# Patient Record
Sex: MALE | Race: WHITE | NOT HISPANIC OR LATINO | Employment: OTHER | URBAN - METROPOLITAN AREA
[De-identification: names, ages, dates, MRNs, and addresses within clinical notes are randomized per-mention and may not be internally consistent; named-entity substitution may affect disease eponyms.]

---

## 2024-07-11 ENCOUNTER — OFFICE VISIT (OUTPATIENT)
Dept: FAMILY MEDICINE CLINIC | Facility: CLINIC | Age: 59
End: 2024-07-11
Payer: COMMERCIAL

## 2024-07-11 ENCOUNTER — TELEPHONE (OUTPATIENT)
Dept: FAMILY MEDICINE CLINIC | Facility: CLINIC | Age: 59
End: 2024-07-11

## 2024-07-11 VITALS
SYSTOLIC BLOOD PRESSURE: 130 MMHG | RESPIRATION RATE: 17 BRPM | DIASTOLIC BLOOD PRESSURE: 70 MMHG | OXYGEN SATURATION: 95 % | HEIGHT: 70 IN | TEMPERATURE: 98.2 F | HEART RATE: 71 BPM | BODY MASS INDEX: 33.21 KG/M2 | WEIGHT: 232 LBS

## 2024-07-11 DIAGNOSIS — Z00.00 ANNUAL PHYSICAL EXAM: Primary | ICD-10-CM

## 2024-07-11 DIAGNOSIS — N48.1 BALANITIS: Primary | ICD-10-CM

## 2024-07-11 DIAGNOSIS — Z12.5 SCREENING FOR PROSTATE CANCER: ICD-10-CM

## 2024-07-11 PROCEDURE — 99203 OFFICE O/P NEW LOW 30 MIN: CPT | Performed by: FAMILY MEDICINE

## 2024-07-11 RX ORDER — CLOTRIMAZOLE 1 %
1 CREAM (GRAM) TOPICAL 2 TIMES DAILY
Qty: 60 G | Refills: 1 | Status: SHIPPED | OUTPATIENT
Start: 2024-07-11

## 2024-07-11 RX ORDER — FLUCONAZOLE 150 MG/1
150 TABLET ORAL DAILY
Qty: 10 TABLET | Refills: 1 | Status: SHIPPED | OUTPATIENT
Start: 2024-07-11

## 2024-07-11 NOTE — TELEPHONE ENCOUNTER
Inquire if lab orders are needed prior to appt 12/13/24-physical. I will call pt once orders are submitted

## 2024-07-11 NOTE — PROGRESS NOTES
"Chief Complaint  Chief Complaint   Patient presents with    [facility]     New  Pt     Laceration     Pt states he has cuts on foreskin x 2 months       Subjective    History of Present Illness        Rc Robert presents to [facility] for   History of Present Illness  The patient is a 59-year-old male who presents for evaluation of foreskin issues.    The patient reports experiencing cracks and severe irritation on his foreskin, which initiated approximately a year ago. Initially, the condition was not severe, but recently, the condition has escalated to the appearance of cuts. He is uncircumcised and has attempted to retract his foreskin, which results in excruciating pain. Despite regular showers, he attempts to dry the area, but this has not provided relief. He is not sexually active, but his wife occasionally manipulates his foreskin, which he believes may have resulted in skin damage. He has also noticed cuts following intercourse. Despite applying Vaseline to the affected area, there has been no improvement.     History of Present Illness      Objective   Vital Signs:   Visit Vitals  /70   Pulse 71   Temp 98.2 °F (36.8 °C)   Resp 17   Ht 177.8 cm (70\")   Wt 105 kg (232 lb)   SpO2 95%   BMI 33.29 kg/m²          BMI is >= 30 and <35. (Class 1 Obesity). The following options were offered after discussion;: exercise counseling/recommendations and nutrition counseling/recommendations     Physical Exam  Vitals reviewed.   Constitutional:       Appearance: He is well-developed.   HENT:      Head: Normocephalic.      Right Ear: External ear normal.      Left Ear: External ear normal.      Nose: Nose normal.   Eyes:      Conjunctiva/sclera: Conjunctivae normal.   Cardiovascular:      Rate and Rhythm: Normal rate and regular rhythm.   Pulmonary:      Effort: Pulmonary effort is normal.      Breath sounds: Normal breath sounds.   Genitourinary:     Penis: Uncircumcised.       " Vernon stage (genital): 5.      Comments: Patient has balanitis.  Musculoskeletal:         General: Normal range of motion.      Cervical back: Normal range of motion and neck supple.   Skin:     General: Skin is warm and dry.      Capillary Refill: Capillary refill takes less than 2 seconds.   Neurological:      Mental Status: He is alert and oriented to person, place, and time.        Physical Exam  Normal lung sounds.  Normal heart sounds.         Result Review :  Results                            Assessment and Plan      Diagnoses and all orders for this visit:    1. Balanitis (Primary)  Assessment & Plan:  Worsening new diagnosis.  Patient was given Diflucan and Lotrimin cream to treat his symptoms.  Patient is encouraged to return to clinic if symptoms do not improve.    Orders:  -     fluconazole (Diflucan) 150 MG tablet; Take 1 tablet by mouth Daily.  Dispense: 10 tablet; Refill: 1  -     clotrimazole (LOTRIMIN) 1 % cream; Apply 1 Application topically to the appropriate area as directed 2 (Two) Times a Day.  Dispense: 60 g; Refill: 1       Assessment & Plan             Follow Up   No follow-ups on file.  Patient was given instructions and counseling regarding his condition or for health maintenance advice. Please see specific information pulled into the AVS if appropriate.     Patient or patient representative verbalized consent for the use of Ambient Listening during the visit with  Jose Leonard Sr, MD for chart documentation. 7/21/2024  00:14 EDT

## 2024-07-21 PROBLEM — N48.1 BALANITIS: Status: ACTIVE | Noted: 2024-07-21

## 2024-07-21 NOTE — ASSESSMENT & PLAN NOTE
Worsening new diagnosis.  Patient was given Diflucan and Lotrimin cream to treat his symptoms.  Patient is encouraged to return to clinic if symptoms do not improve.